# Patient Record
Sex: FEMALE | Race: WHITE | URBAN - METROPOLITAN AREA
[De-identification: names, ages, dates, MRNs, and addresses within clinical notes are randomized per-mention and may not be internally consistent; named-entity substitution may affect disease eponyms.]

---
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
## 2020-03-04 ENCOUNTER — OFFICE VISIT (OUTPATIENT)
Dept: PRIMARY CARE CLINIC | Age: 20
End: 2020-03-04

## 2020-03-04 VITALS
HEART RATE: 89 BPM | WEIGHT: 120 LBS | DIASTOLIC BLOOD PRESSURE: 62 MMHG | HEIGHT: 63 IN | BODY MASS INDEX: 21.26 KG/M2 | SYSTOLIC BLOOD PRESSURE: 100 MMHG | OXYGEN SATURATION: 99 %

## 2020-03-04 PROCEDURE — 99213 OFFICE O/P EST LOW 20 MIN: CPT | Performed by: NURSE PRACTITIONER

## 2020-03-04 RX ORDER — MEDROXYPROGESTERONE ACETATE 150 MG/ML
INJECTION, SUSPENSION INTRAMUSCULAR
COMMUNITY
Start: 2019-12-27

## 2020-03-04 RX ORDER — POLYMYXIN B SULFATE AND TRIMETHOPRIM 1; 10000 MG/ML; [USP'U]/ML
1 SOLUTION OPHTHALMIC EVERY 6 HOURS
Qty: 1 BOTTLE | Refills: 0 | Status: SHIPPED | OUTPATIENT
Start: 2020-03-04 | End: 2020-03-11

## 2020-03-04 NOTE — PROGRESS NOTES
Chief Complaint   Patient presents with    Conjunctivitis     Rt eye since yesterday       HPI:  Patient presents today for complaints of bilateral conjunctivitis x 1 day. She reports that she woke up this morning with both eyes matted shut with crusty drainage. She has continued to have this drainage this morning as well as both of her eyes are red. She denies any pain or visual disturbance. Reports that her eyes are itchy. She does not wear contacts. Prior to Visit Medications    Medication Sig Taking? Authorizing Provider   medroxyPROGESTERone (DEPO-PROVERA) 150 MG/ML injection  Yes Historical Provider, MD         Allergies   Allergen Reactions    Penicillins          Review of Systems  Review of Systems   Constitutional: Negative for chills and fever. HENT: Negative for congestion and nosebleeds. Eyes: Positive for discharge, redness and itching. Negative for photophobia, pain and visual disturbance. Respiratory: Negative for cough, shortness of breath and wheezing. Cardiovascular: Negative for chest pain, palpitations and leg swelling. Gastrointestinal: Negative for constipation, diarrhea, nausea and vomiting. Genitourinary: Negative for dysuria and urgency. Musculoskeletal: Negative for neck pain. Neurological: Negative for headaches. VS:  /62 (Site: Right Upper Arm, Position: Sitting)   Pulse 89   Ht 5' 3\" (1.6 m)   Wt 120 lb (54.4 kg)   SpO2 99%   BMI 21.26 kg/m²     Patient's medical, social, and family history reviewed      Physical Exam  Physical Exam  Constitutional:       Appearance: She is well-developed. HENT:      Head: Normocephalic. Eyes:      Conjunctiva/sclera:      Right eye: Right conjunctiva is injected. Exudate present. Left eye: Left conjunctiva is injected. Exudate present. Pupils: Pupils are equal, round, and reactive to light. Neck:      Musculoskeletal: Normal range of motion and neck supple. Thyroid: No thyromegaly. Cardiovascular:      Rate and Rhythm: Normal rate and regular rhythm. Pulmonary:      Effort: Pulmonary effort is normal.      Breath sounds: Normal breath sounds. Abdominal:      General: Bowel sounds are normal.      Palpations: Abdomen is soft. Musculoskeletal: Normal range of motion. Lymphadenopathy:      Cervical: No cervical adenopathy. Skin:     General: Skin is warm and dry. Neurological:      Mental Status: She is alert and oriented to person, place, and time. Psychiatric:         Behavior: Behavior normal.           Assessment/Plan:    1. Conjunctivitis of both eyes, unspecified conjunctivitis type    - trimethoprim-polymyxin b (POLYTRIM) 14338-6.1 UNIT/ML-% ophthalmic solution; Place 1 drop into both eyes every 6 hours for 7 days  Dispense: 1 Bottle; Refill: 0    Return if symptoms worsen or fail to improve.         SUKH Meeks - CNP

## 2020-03-05 ASSESSMENT — ENCOUNTER SYMPTOMS
CONSTIPATION: 0
DIARRHEA: 0
EYE REDNESS: 1
WHEEZING: 0
PHOTOPHOBIA: 0
EYE ITCHING: 1
VOMITING: 0
SHORTNESS OF BREATH: 0
EYE DISCHARGE: 1
NAUSEA: 0
COUGH: 0
EYE PAIN: 0

## 2021-02-26 ENCOUNTER — OFFICE VISIT (OUTPATIENT)
Dept: PSYCHOLOGY | Age: 21
End: 2021-02-26

## 2021-02-26 DIAGNOSIS — F43.10 PTSD (POST-TRAUMATIC STRESS DISORDER): Primary | ICD-10-CM

## 2021-02-26 PROCEDURE — 99202 OFFICE O/P NEW SF 15 MIN: CPT | Performed by: PSYCHIATRY & NEUROLOGY
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
## 2021-03-01 NOTE — TELEPHONE ENCOUNTER
Pt called and stated that she needs her RX refill sent to a different pharmacy. [facility] does not accept her insurance. She also stated that the script was written for [facility]" and needs to be in her full name Yesica Olvera. Pended med to correct pharm.

## 2021-03-29 ENCOUNTER — OFFICE VISIT (OUTPATIENT)
Dept: PSYCHOLOGY | Age: 21
End: 2021-03-29

## 2021-03-29 DIAGNOSIS — F43.10 PTSD (POST-TRAUMATIC STRESS DISORDER): Primary | ICD-10-CM

## 2021-03-29 PROCEDURE — 99212 OFFICE O/P EST SF 10 MIN: CPT | Performed by: PSYCHIATRY & NEUROLOGY

## 2021-05-03 ENCOUNTER — OFFICE VISIT (OUTPATIENT)
Dept: PSYCHOLOGY | Age: 21
End: 2021-05-03

## 2021-05-03 DIAGNOSIS — F43.10 PTSD (POST-TRAUMATIC STRESS DISORDER): Primary | ICD-10-CM

## 2021-05-03 PROCEDURE — 99212 OFFICE O/P EST SF 10 MIN: CPT | Performed by: PSYCHIATRY & NEUROLOGY

## 2021-05-03 RX ORDER — SERTRALINE HYDROCHLORIDE 100 MG/1
100 TABLET, FILM COATED ORAL NIGHTLY
Qty: 30 TABLET | Refills: 0 | Status: SHIPPED
Start: 2021-05-03 | End: 2021-05-17 | Stop reason: SDUPTHER
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
## 2021-05-17 ENCOUNTER — VIRTUAL VISIT (OUTPATIENT)
Dept: PSYCHOLOGY | Age: 21
End: 2021-05-17

## 2021-05-17 DIAGNOSIS — F43.10 PTSD (POST-TRAUMATIC STRESS DISORDER): Primary | ICD-10-CM

## 2021-05-17 PROCEDURE — 99442 PR PHYS/QHP TELEPHONE EVALUATION 11-20 MIN: CPT | Performed by: PSYCHIATRY & NEUROLOGY

## 2021-05-17 RX ORDER — SERTRALINE HYDROCHLORIDE 100 MG/1
100 TABLET, FILM COATED ORAL NIGHTLY
Qty: 30 TABLET | Refills: 0 | Status: SHIPPED | OUTPATIENT
Start: 2021-05-17

## 2021-05-17 NOTE — PROGRESS NOTES
PSYCHIATRY ATTENDING NOTE    S: Patient being seen at [facility] in follow-up for PTSD, mood disorder and borderline traits. Zoloft was increased to 100 mg last appointment. Spoke with patient via telephone today which she consents to. Patient location home. Provider location [facility]. Stanley Casper sounds to be in bright spirits and reports she is \"pretty good\" today. She acknowledges improvement in overall mood with the Zoloft and is happy to be back home. She reports still not having much of an appetite and has lost 11-12 pound unintentionally over the last month - discussed having her continue to monitor for now. She is also not sleeping well at night but napping for up to 6-7 hours through the day - discussed she needs to reset her sleep-wake cycle and may use melatonin to assist. Patient has not resumed individual counseling yet but reports her mother is trying to get her set up somewhere close to where they live which was encouraged. Otherwise no acute issues or safety concerns. Tolerating medication without incident. MSE:  Female. Pleasant, cooperative, forthcoming. Mood euthymic. Speech clear. Thoughts organized. Content future-oriented. No active suicidal or homicidal ideations. No paranoia, delusions or hallucinations. Orientation, concentration, recent and remote memory are grossly intact. Fund of knowledge fair. Language use fair. Insight and judgment fair. MEDICATIONS:   Zoloft 100 mg daily (continuing)    ASSESSMENT:   PTSD                                      Mood Disorder                                      Borderline Traits    PLAN: Continue same dose of Zoloft. Encouraged individual counseling. Continue to monitor symptoms and response to treatment. Speak with patient in four weeks.     Total time spent 15 min                         Electronically signed by Cristiano Rendon MD on 5/17/2021 at 9:59 AM